# Patient Record
Sex: FEMALE | Race: WHITE | Employment: OTHER
[De-identification: names, ages, dates, MRNs, and addresses within clinical notes are randomized per-mention and may not be internally consistent; named-entity substitution may affect disease eponyms.]

---

## 2024-05-18 ENCOUNTER — APPOINTMENT (OUTPATIENT)
Facility: HOSPITAL | Age: 89
End: 2024-05-18
Payer: MEDICARE

## 2024-05-18 ENCOUNTER — HOSPITAL ENCOUNTER (EMERGENCY)
Facility: HOSPITAL | Age: 89
Discharge: NURSING, REHAB OR LONG-TERM CARE | End: 2024-05-18
Attending: EMERGENCY MEDICINE
Payer: MEDICARE

## 2024-05-18 VITALS
RESPIRATION RATE: 18 BRPM | OXYGEN SATURATION: 91 % | TEMPERATURE: 98 F | HEART RATE: 91 BPM | WEIGHT: 110 LBS | SYSTOLIC BLOOD PRESSURE: 123 MMHG | DIASTOLIC BLOOD PRESSURE: 70 MMHG

## 2024-05-18 DIAGNOSIS — S01.01XA LACERATION OF SCALP, INITIAL ENCOUNTER: Primary | ICD-10-CM

## 2024-05-18 DIAGNOSIS — S02.2XXA CLOSED FRACTURE OF NASAL BONE, INITIAL ENCOUNTER: ICD-10-CM
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
## 2024-05-18 DIAGNOSIS — S09.90XA INJURY OF HEAD, INITIAL ENCOUNTER: ICD-10-CM

## 2024-05-18 PROCEDURE — 90471 IMMUNIZATION ADMIN: CPT | Performed by: EMERGENCY MEDICINE

## 2024-05-18 PROCEDURE — 90714 TD VACC NO PRESV 7 YRS+ IM: CPT | Performed by: EMERGENCY MEDICINE

## 2024-05-18 PROCEDURE — 12001 RPR S/N/AX/GEN/TRNK 2.5CM/<: CPT

## 2024-05-18 PROCEDURE — 6360000002 HC RX W HCPCS: Performed by: EMERGENCY MEDICINE

## 2024-05-18 PROCEDURE — 72125 CT NECK SPINE W/O DYE: CPT

## 2024-05-18 PROCEDURE — 70450 CT HEAD/BRAIN W/O DYE: CPT

## 2024-05-18 PROCEDURE — 2500000003 HC RX 250 WO HCPCS: Performed by: EMERGENCY MEDICINE

## 2024-05-18 PROCEDURE — 70486 CT MAXILLOFACIAL W/O DYE: CPT

## 2024-05-18 PROCEDURE — 99284 EMERGENCY DEPT VISIT MOD MDM: CPT

## 2024-05-18 RX ORDER — LIDOCAINE HYDROCHLORIDE AND EPINEPHRINE 10; 10 MG/ML; UG/ML
1 INJECTION, SOLUTION INFILTRATION; PERINEURAL ONCE
Status: COMPLETED | OUTPATIENT
Start: 2024-05-18 | End: 2024-05-18

## 2024-05-18 RX ADMIN — LIDOCAINE HYDROCHLORIDE,EPINEPHRINE BITARTRATE 1 ML: 10; .01 INJECTION, SOLUTION INFILTRATION; PERINEURAL at 13:36

## 2024-05-18 RX ADMIN — CLOSTRIDIUM TETANI TOXOID ANTIGEN (FORMALDEHYDE INACTIVATED) AND CORYNEBACTERIUM DIPHTHERIAE TOXOID ANTIGEN (FORMALDEHYDE INACTIVATED) 0.5 ML: 5; 2 INJECTION, SUSPENSION INTRAMUSCULAR at 13:37

## 2024-05-18 NOTE — ED PROVIDER NOTES
EMERGENCY DEPARTMENT PHYSICIAN NOTE     Patient: Tanisha Dao     Time of Service: 5/18/2024 12:39 PM     Chief complaint:   Chief Complaint   Patient presents with    Fall    Laceration        HISTORY:  Patient is a 88 y.o. female who presents to the emergency department with complaints of fall, laceration.       Past Medical History:   Diagnosis Date    Cerebral artery occlusion with cerebral infarction (HCC)     Dementia (HCC)     Depression     Hypertension     Parkinson's disease (HCC)         History reviewed. No pertinent surgical history.     History reviewed. No pertinent family history.     Social History     Socioeconomic History    Marital status:      Spouse name: None    Number of children: None    Years of education: None    Highest education level: None   Tobacco Use    Smoking status: Unknown   Vaping Use    Vaping Use: Never used   Substance and Sexual Activity    Alcohol use: Not Currently    Drug use: Never        Current Medications: Reviewed in chart.    Allergies: No Known Allergies       REVIEW OF SYSTEMS: See HPI for pertinent positives and negatives.      PHYSICAL EXAM:  /70   Pulse 91   Temp 98 °F (36.7 °C) (Temporal)   Resp 18   Wt 49.9 kg (110 lb)   SpO2 91%    Physical Exam  Vitals and nursing note reviewed.   Constitutional:       General: She is not in acute distress.     Appearance: Normal appearance. She is normal weight. She is not toxic-appearing.   HENT:      Head: Normocephalic and atraumatic.      Nose: Nose normal.      Mouth/Throat:      Mouth: Mucous membranes are moist.      Pharynx: Oropharynx is clear.   Eyes:      Extraocular Movements: Extraocular movements intact.      Conjunctiva/sclera: Conjunctivae normal.      Pupils: Pupils are equal, round, and reactive to light.   Cardiovascular:      Rate and Rhythm: Normal rate and regular rhythm.      Pulses: Normal pulses.      Heart sounds: Normal heart sounds.   Pulmonary:      Effort: Pulmonary    tetanus & diphtheria toxoids (adult) 5-2 LFU injection 0.5 mL (0.5 mLs IntraMUSCular Given 5/18/24 1337)   lidocaine-EPINEPHrine 1 %-1:[id number] injection 1 mL (1 mL IntraDERmal Given 5/18/24 1336)       Differential Diagnosis included but not limited to:  Head injury including but not limited to concussion, skull fracture, intraparenchymal contusion, subarachnoid, subdural and epidural hematoma.    Medical Decision Making  The patient was placed into an examination in room.    Nursing notes were reviewed.    The patient was interviewed and an examination was completed by me with the above findings.        MDM:    This is an 88-year-old female presents to the ED via EMS from nursing facility for complaint of a fall out of her wheelchair.  Patient reportedly follows with her wheelchair often and this was witnessed.  Patient did hit her head but not lose consciousness.  She is not on any anticoagulants.  She has been acting appropriately at baseline she does have an underlying history of dementia.  She is not tender to her neck, thoracic or lumbar spine, chest or abdomen or extremities.  Patient does have a 5 cm horizontal laceration across the frontal scalp.  Bleeding is controlled with pressure.  Please see procedure note for repair.    CT of the head and cervical spine did not show any acute fractures or dislocation.  CT maxillofacial bones did show mildly displaced nasal fracture.    Plan discussed with the son at bedside.  At this time patient stable for discharge home.  Suture care and suture removal instructions were given.  Closed and precautions given.        It is my clinical impression today patient presents for: Fracture, closed head injury injury, laceration, fall        I've discussed my findings, impression in detail with the patient at the bedside.  I have provided the opportunity to ask questions, and have addressed all questions at the bedside.    Expectations, return precautions verbalized at

## 2024-05-18 NOTE — ED TRIAGE NOTES
Pt arrives via [facility] report of \"pt is from [facility]. She had an unwitnessed GLF from a wheelchair was found conscious has a 5cm laceration to left forehead staff reports she is nonverbal dementia acting at baseline. VSS\"

## 2024-05-18 NOTE — ED NOTES
TRANSFER - OUT REPORT:    Verbal report given to Bree Rubio at at [facility] on Tanisha Dao  being transferred to Home [facility] for routine progression of patient care       Report consisted of patient's Situation, Background, Assessment and   Recommendations(SBAR).     Information from the following report(s) ED SBAR was reviewed with the receiving nurse.    [place] Fall Assessment:    Presents to emergency department  because of falls (Syncope, seizure, or loss of consciousness): Yes  Age > 70: Yes  Altered Mental Status, Intoxication with alcohol or substance confusion (Disorientation, impaired judgment, poor safety awaremess, or inability to follow instructions): Yes  Impaired Mobility: Ambulates or transfers with assistive devices or assistance; Unable to ambulate or transer.: Yes  Nursing Judgement: Yes          Lines:       Opportunity for questions and clarification was provided.      Patient transported with:

## 2024-05-18 NOTE — DISCHARGE INSTRUCTIONS
Routine appointments for health maintenance with a primary care provider are very important and emergency department visits are no substitute.  You should review all findings and test results from your visit today with your primary care physician.        We recommended that you take medications as prescribed.    Have sutures taken out in 5-7 days.    Return for any concern of infection from the laceration repair site.     Return to the emergency department for any new or concerning signs/symptoms or failure to improve.